# Patient Record
Sex: FEMALE | Race: WHITE
[De-identification: names, ages, dates, MRNs, and addresses within clinical notes are randomized per-mention and may not be internally consistent; named-entity substitution may affect disease eponyms.]

---

## 2021-03-18 ENCOUNTER — HOSPITAL ENCOUNTER (EMERGENCY)
Dept: HOSPITAL 56 - MW.ED | Age: 26
Discharge: NURSING, REHAB OR LONG-TERM CARE | End: 2021-03-18
Payer: COMMERCIAL

## 2021-03-18 VITALS — HEART RATE: 97 BPM | DIASTOLIC BLOOD PRESSURE: 55 MMHG | SYSTOLIC BLOOD PRESSURE: 129 MMHG

## 2021-03-18 DIAGNOSIS — Z95.4: ICD-10-CM

## 2021-03-18 DIAGNOSIS — Z86.73: ICD-10-CM

## 2021-03-18 DIAGNOSIS — R56.9: ICD-10-CM

## 2021-03-18 DIAGNOSIS — I10: ICD-10-CM

## 2021-03-18 DIAGNOSIS — Z79.899: ICD-10-CM
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
## 2021-03-18 DIAGNOSIS — Z79.82: ICD-10-CM

## 2021-03-18 DIAGNOSIS — Z79.01: ICD-10-CM

## 2021-03-18 DIAGNOSIS — S43.024A: Primary | ICD-10-CM

## 2021-03-18 LAB
BUN SERPL-MCNC: 13 MG/DL (ref 7–18)
CHLORIDE SERPL-SCNC: 100 MMOL/L (ref 98–107)
CO2 SERPL-SCNC: 26.2 MMOL/L (ref 21–32)
GLUCOSE SERPL-MCNC: 112 MG/DL (ref 74–106)
POTASSIUM SERPL-SCNC: 3.4 MMOL/L (ref 3.5–5.1)
SODIUM SERPL-SCNC: 138 MMOL/L (ref 136–145)

## 2021-03-18 PROCEDURE — 96375 TX/PRO/DX INJ NEW DRUG ADDON: CPT

## 2021-03-18 PROCEDURE — 96374 THER/PROPH/DIAG INJ IV PUSH: CPT

## 2021-03-18 PROCEDURE — 73030 X-RAY EXAM OF SHOULDER: CPT

## 2021-03-18 PROCEDURE — 85025 COMPLETE CBC W/AUTO DIFF WBC: CPT

## 2021-03-18 PROCEDURE — 85610 PROTHROMBIN TIME: CPT

## 2021-03-18 PROCEDURE — 84703 CHORIONIC GONADOTROPIN ASSAY: CPT

## 2021-03-18 PROCEDURE — 96376 TX/PRO/DX INJ SAME DRUG ADON: CPT

## 2021-03-18 PROCEDURE — 84484 ASSAY OF TROPONIN QUANT: CPT

## 2021-03-18 PROCEDURE — 36415 COLL VENOUS BLD VENIPUNCTURE: CPT

## 2021-03-18 PROCEDURE — 84146 ASSAY OF PROLACTIN: CPT

## 2021-03-18 PROCEDURE — 80053 COMPREHEN METABOLIC PANEL: CPT

## 2021-03-18 PROCEDURE — 99284 EMERGENCY DEPT VISIT MOD MDM: CPT

## 2021-03-18 PROCEDURE — 83735 ASSAY OF MAGNESIUM: CPT

## 2021-03-18 PROCEDURE — 70450 CT HEAD/BRAIN W/O DYE: CPT

## 2021-03-18 NOTE — CT
INDICATION:



Seizure. Right upper extremity weakness



TECHNIQUE:



CT head without contrast.



COMPARISON:



None available 



FINDINGS:



There is mild cerebral and cerebellar cortical atrophy for age. The 

ventricles are within normal limits. There is a left middle cranial fossa 

arachnoid cyst. There is no midline shift. An area of encephalomalacia is 

seen in the posteromedial left occipital lobe consistent with a chronic 

infarct/sequela of old insult. There is no loss of gray-white 

differentiation. A small ill-defined focus of increased attenuation in the 

anterior maria dolores on image 14 could be related to regional artifact. There is 

no evidence of an acute extra-axial hemorrhage. 



No acute calvarial fracture is seen. 



There is a left maxillary sinus mucosal retention cyst or polyp. The 

mastoid air cells are clear. The visualized orbits are within normal 

limits. 



IMPRESSION:



No evidence of an acute extra-axial hemorrhage, mass effect or loss of 

gray-white differentiation. A high attenuation focus in the anterior maria dolores 

could be related to regional artifact or may represent a nonspecific 

regional parenchymal calcification. A focus of age indeterminate 

parenchymal blood products cannot be excluded, although there are no 

findings of significant trauma. If clinically indicated, correlate with a 

short-term follow-up study in 4-8 hours.



Mild cerebral and cerebellar atrophy for age. Focal encephalomalacia in the 

posteromedial left occipital lobe. A left temporal arachnoid cyst.



Please note that all CT scans at this facility use dose modulation, 

iterative reconstruction, and/or weight-based dosing when appropriate to 

reduce radiation dose to as low as reasonably achievable.



Dictated by Simon Suggs MD @ Mar 18 2021  5:31AM



Signed by Dr. Simon Suggs @ Mar 18 2021  5:44AM

## 2021-03-18 NOTE — EDM.PDOC
ED HPI GENERAL MEDICAL PROBLEM





- General


Chief Complaint: Upper Extremity Injury/Pain


Stated Complaint: RIGHT SHOULDER PAIN


Time Seen by Provider: 03/18/21 04:07





- History of Present Illness


INITIAL COMMENTS - FREE TEXT/NARRATIVE: 





History of present illness:


[] Patient complains of severe pain and difficulty moving the right shoulder.  

Her boyfriend said that she was shaking from side to side as though she was 

having a seizure.  When she woke up she did not know his name and was confused 

gradually returning to baseline.  The patient now has pain in the right shoulder

 that makes it difficult to move the right shoulder.  The patient has had 

multiple strokes and 2 replaced heart valves.  She is on warfarin and aspirin.





The boyfriend is able to give a good history.  What he describes sounds like a 

tonic-clonic seizure.  Her movement woke him up.  She was asleep but she was 

having tonic-clonic motions that were symmetric.  After they stop she did not 

wake up for 2 to 3 minutes.  When she woke up she was unsteady and could not 

stand.  She did not know his name.  She was confused and looking for Tylenol and

 the spice rack.





She adds that she had a difficult time little more than 6 months ago and was 

drinking more than she thinks she should for about 6 months.  She stopped 3 days

 ago and thinks that that might be a contributor to her first and only seizure.





Last p.o. intake was dinnertime last night. 





Is a past history of congenital heart disease.  She had a pulmonic valve 

replacement and an aortic valve replacement and at least one of them is 

combination of tissue and metal.  Prior history of strokes as well.





Review of systems: 


As per history of present illness and below otherwise all systems reviewed and 

negative.





Past medical history: 


As per history of present illness and as reviewed below otherwise 

noncontributory.





Surgical history: 


As per history of present illness and as reviewed below otherwise 

noncontributory.





Social history: 


No reported history of drug or alcohol abuse.





Family history: 


As per history of present illness and as reviewed below otherwise 

noncontributory.





Physical exam:


Constitutional - well developed, well-nourished and in no acute distress


HEENT - normocephalic, no evidence of trauma - external nose and mouth normal - 

no mass in neck and no JVD - mucosae moist





EYES - full EOM, PERRL, no icterus - no evidence of inflammation, injection, or 

drainage





Respiratory - no respiratory distress, equal bilateral expansion, lungs clear to

 auscultation and no abnormal lung sounds





Cardiovascular - Regular Rhythm with S1 and S2 appreciated and no murmur, gallop

 or rub.





GI - abdomen soft without distension or organomegaly - normal bowel sounds - no 

guard or rebound





Musculoskeletal and tenderness in the right shoulder with very limited range of 

motion otherwise no gross deformity of long bones or joints - no tenderness, 

swelling or edema





Neurologic - Alert and oriented times four - CN II-XII grossly intact - motor 

sensory and coordination symmetrically normal as best I can tell although the 

exam of the right upper extremity does not include raising the arm in extension 

for drift because she has such limited range of motion and pain in the right 

shoulder.





Psychiatric - appropriate mood and affect with normal thought content





Hematologic - No petechiae or purpura - mucosa appropriate color and sclera not 

pale - normal nail bed color and refill





Integument - no rash or evidence of trauma - normal turgor





Diagnostics:


[]





Therapeutics:


[]





Impression: 


[]





Plan:


[]





Definitive disposition and diagnosis as appropriate pending reevaluation and 

review of above.








  ** right shoulder


Pain Score (Numeric/FACES): 4





- Related Data


                                    Allergies











Allergy/AdvReac Type Severity Reaction Status Date / Time


 


No Known Allergies Allergy   Verified 03/18/21 04:12











Home Meds: 


                                    Home Meds





Aspirin 81 mg PO DAILY 05/16/17 [History]


Lisinopril 20 tab PO DAILY 05/16/17 [History]


Warfarin [Coumadin] mg PO DAILY 03/18/21 [History]











Past Medical History


HEENT History: Reports: None


Cardiovascular History: Reports: Hypertension


Other Cardiovascular History: aortic stenosis, pulmonary stenosis,coartation of 

aorta


Respiratory History: Reports: None


Gastrointestinal History: Reports: None


Genitourinary History: Reports: None


OB/GYN History: Reports: None


Musculoskeletal History: Reports: None


Neurological History: Reports: None


Psychiatric History: Reports: Anxiety, Depression, Suicide Attempt, Suicidal 

Ideation


Endocrine/Metabolic History: Reports: None


Hematologic History: Reports: None


Immunologic History: Reports: None


Oncologic (Cancer) History: Reports: None


Dermatologic History: Reports: None





- Infectious Disease History


Infectious Disease History: Reports: None





- Past Surgical History


Cardiovascular Surgical History: Reports: Valve Replacement, Other (See Below)


Other Cardiovascular Surgeries/Procedures: stents replace





Social & Family History





- Family History


Family Medical History: No Pertinent Family History





- Caffeine Use


Caffeine Use: Reports: Energy Drinks, Soda





Review of Systems





- Review of Systems


Review Of Systems: Comprehensive ROS is negative, except as noted in HPI.





ED EXAM, GENERAL





- Physical Exam


Exam: See Below


Free Text/Narrative:: 





My physical exam is in the HPI


  ** #1 Interpretation


EKG Interpretation Comments: 





EKG done at 4:10 AM shows an AV dual paced complex with a heart rate of 64 and 

an axis of -81.  She has a QRS duration of 66 ms.  Comparison to her EKG is not 

valid because the only prior on the record was her intrinsic rhythm and not 

paced.  In paced rhythm





Course





- Vital Signs


Text/Narrative:: 





05 53 the patient has a CT that is been read and there is no obvious bleed or 

acute infarct.  The patient does have one area in the maria dolores that was 

indeterminate and in the differential was an age indeterminate spot with blood 

products.  Case discussed with Thai from anesthesia and Dr. LOZANO will see the 

patient and evaluate for possible sedation and reduction.





Dr. Rodriguez came to the department at 7:00.  For he agreed to sedate the patient 

so that I can try to reduce the shoulder we looked over the CT report and then 

the final report the radiologist as stated there is an area of lucency near the 

maria dolores that may represent blood.  With new onset seizure on blood thinners with a 

CT that is indeterminate for possible bleed and a posterior dislocation in a 

facility that has no orthopedics I called [facility] and discussed with Dr. TIMI Anderson who most graciously agreed that he accept the patient and arrange her

 care.


Last Recorded V/S: 


                                Last Vital Signs











Temp  36.6 C   03/18/21 04:08


 


Pulse  97   03/18/21 07:54


 


Resp  17   03/18/21 07:54


 


BP  129/55 L  03/18/21 07:54


 


Pulse Ox  97   03/18/21 07:54














- Orders/Labs/Meds


Orders: 


                               Active Orders 24 hr











 Category Date Time Status


 


 Saline Lock Insert [OM.PC] Stat Oth  03/18/21 04:11 Ordered











Labs: 


                                Laboratory Tests











  03/18/21 03/18/21 03/18/21 Range/Units





  04:35 04:35 04:35 


 


WBC  6.33    (4.0-11.0)  K/uL


 


RBC  3.62 L    (4.30-5.90)  M/uL


 


Hgb  12.9    (12.0-16.0)  g/dL


 


Hct  38.6    (36.0-46.0)  %


 


MCV  106.6 H    (80.0-98.0)  fL


 


MCH  35.6 H    (27.0-32.0)  pg


 


MCHC  33.4    (31.0-37.0)  g/dL


 


RDW Std Deviation  54.6    (28.0-62.0)  fl


 


RDW Coeff of Sayra  14    (11.0-15.0)  %


 


Plt Count  141 L    (150-400)  K/uL


 


MPV  9.80    (7.40-12.00)  fL


 


Neut % (Auto)  73.0    (48.0-80.0)  %


 


Lymph % (Auto)  14.7 L    (16.0-40.0)  %


 


Mono % (Auto)  11.5    (0.0-15.0)  %


 


Eos % (Auto)  0.5    (0.0-7.0)  %


 


Baso % (Auto)  0.3    (0.0-1.5)  %


 


Neut # (Auto)  4.6    (1.4-5.7)  K/uL


 


Lymph # (Auto)  0.9    (0.6-2.4)  K/uL


 


Mono # (Auto)  0.7    (0.0-0.8)  K/uL


 


Eos # (Auto)  0.0    (0.0-0.7)  K/uL


 


Baso # (Auto)  0.0    (0.0-0.1)  K/uL


 


Nucleated RBC %  0.0    /100WBC


 


Nucleated RBCs #  0    K/uL


 


INR   1.06   


 


Sodium    138  (136-145)  mmol/L


 


Potassium    3.4 L  (3.5-5.1)  mmol/L


 


Chloride    100  ()  mmol/L


 


Carbon Dioxide    26.2  (21.0-32.0)  mmol/L


 


BUN    13  (7.0-18.0)  mg/dL


 


Creatinine    0.8  (0.6-1.0)  mg/dL


 


Est Cr Clr Drug Dosing    111.37  mL/min


 


Estimated GFR (MDRD)    > 60.0  ml/min


 


Glucose    112 H  ()  mg/dL


 


Calcium    9.0  (8.5-10.1)  mg/dL


 


Magnesium    1.9  (1.8-2.4)  mg/dL


 


Total Bilirubin    1.7 H  (0.2-1.0)  mg/dL


 


AST    69 H  (15-37)  IU/L


 


ALT    45  (14-63)  IU/L


 


Alkaline Phosphatase    63  ()  U/L


 


Troponin I     (0.000-0.056)  ng/mL


 


Total Protein    7.6  (6.4-8.2)  g/dL


 


Albumin    3.9  (3.4-5.0)  g/dL


 


Globulin    3.7  (2.6-4.0)  g/dL


 


Albumin/Globulin Ratio    1.1  (0.9-1.6)  


 


Prolactin     ng/mL


 


HCG, Qual     (NEG)  














  03/18/21 03/18/21 03/18/21 Range/Units





  04:35 04:35 04:35 


 


WBC     (4.0-11.0)  K/uL


 


RBC     (4.30-5.90)  M/uL


 


Hgb     (12.0-16.0)  g/dL


 


Hct     (36.0-46.0)  %


 


MCV     (80.0-98.0)  fL


 


MCH     (27.0-32.0)  pg


 


MCHC     (31.0-37.0)  g/dL


 


RDW Std Deviation     (28.0-62.0)  fl


 


RDW Coeff of Sayra     (11.0-15.0)  %


 


Plt Count     (150-400)  K/uL


 


MPV     (7.40-12.00)  fL


 


Neut % (Auto)     (48.0-80.0)  %


 


Lymph % (Auto)     (16.0-40.0)  %


 


Mono % (Auto)     (0.0-15.0)  %


 


Eos % (Auto)     (0.0-7.0)  %


 


Baso % (Auto)     (0.0-1.5)  %


 


Neut # (Auto)     (1.4-5.7)  K/uL


 


Lymph # (Auto)     (0.6-2.4)  K/uL


 


Mono # (Auto)     (0.0-0.8)  K/uL


 


Eos # (Auto)     (0.0-0.7)  K/uL


 


Baso # (Auto)     (0.0-0.1)  K/uL


 


Nucleated RBC %     /100WBC


 


Nucleated RBCs #     K/uL


 


INR     


 


Sodium     (136-145)  mmol/L


 


Potassium     (3.5-5.1)  mmol/L


 


Chloride     ()  mmol/L


 


Carbon Dioxide     (21.0-32.0)  mmol/L


 


BUN     (7.0-18.0)  mg/dL


 


Creatinine     (0.6-1.0)  mg/dL


 


Est Cr Clr Drug Dosing     mL/min


 


Estimated GFR (MDRD)     ml/min


 


Glucose     ()  mg/dL


 


Calcium     (8.5-10.1)  mg/dL


 


Magnesium     (1.8-2.4)  mg/dL


 


Total Bilirubin     (0.2-1.0)  mg/dL


 


AST     (15-37)  IU/L


 


ALT     (14-63)  IU/L


 


Alkaline Phosphatase     ()  U/L


 


Troponin I   < 0.050   (0.000-0.056)  ng/mL


 


Total Protein     (6.4-8.2)  g/dL


 


Albumin     (3.4-5.0)  g/dL


 


Globulin     (2.6-4.0)  g/dL


 


Albumin/Globulin Ratio     (0.9-1.6)  


 


Prolactin    31.7  ng/mL


 


HCG, Qual  NEGATIVE    (NEG)  











Meds: 


Medications














Discontinued Medications














Generic Name Dose Route Start Last Admin





  Trade Name Freq  PRN Reason Stop Dose Admin


 


Chlordiazepoxide HCl  50 mg  03/18/21 08:52  03/18/21 08:58





  Chlordiazepoxide 25 Mg Cap  PO  03/18/21 08:53  50 mg





  ONETIME ONE   Administration


 


Hydromorphone HCl  0.5 mg  03/18/21 05:37  03/18/21 05:42





  Hydromorphone 1 Mg/Ml Syringe  IVPUSH  03/18/21 05:38  0.5 mg





  ONETIME ONE   Administration


 


Hydromorphone HCl  1 mg  03/18/21 05:52  03/18/21 06:48





  Hydromorphone 1 Mg/Ml Syringe  IVPUSH  03/18/21 05:53  1 mg





  ONETIME ONE   Administration


 


Hydromorphone HCl  0.5 mg  03/18/21 10:09  03/18/21 10:15





  Hydromorphone 2 Mg/Ml Syringe  IVPUSH  03/18/21 10:10  0.5 mg





  ONETIME ONE   Administration


 


Morphine Sulfate  4 mg  03/18/21 04:29  03/18/21 04:36





  Morphine 4 Mg/Ml Syringe  IVPUSH  03/18/21 04:30  4 mg





  ONETIME ONE   Administration


 


Ondansetron HCl  4 mg  03/18/21 04:29  03/18/21 04:36





  Ondansetron 4 Mg/2 Ml Sdv  IVPUSH  03/18/21 04:30  4 mg





  ONETIME ONE   Administration


 


Ondansetron HCl  4 mg  03/18/21 08:23  03/18/21 08:28





  Ondansetron 4 Mg/2 Ml Sdv  IVPUSH  03/18/21 08:24  4 mg





  ONETIME ONE   Administration


 


Sodium Chloride  10 ml  03/18/21 04:11  03/18/21 04:37





  Sodium Chloride 0.9% 10 Ml Syringe  FLUSH   10 ml





  ASDIRECTED PRN   Administration





  Keep Vein Open  


 


Sodium Chloride  2.5 ml  03/18/21 04:11  03/18/21 04:37





  Sodium Chloride 0.9% 2.5 Ml Syringe  FLUSH   2.5 ml





  ASDIRECTED PRN   Administration





  Keep Vein Open  














Departure





- Departure


Time of Disposition: 10:29


Disposition: DC/Tfer to [facility]


Clinical Impression: 


 Posterior dislocation of right shoulder joint, New onset seizure without head 

trauma








- Discharge Information


Referrals: 


PCP,None [Primary Care Provider] - 


Forms:  ED Department Discharge





Sepsis Event Note (ED)





- Evaluation


Sepsis Screening Result: No Definite Risk





- Focused Exam


Vital Signs: 


                                   Vital Signs











  Pulse Resp BP Pulse Ox


 


 03/18/21 07:54  97  17  129/55 L  97














- My Orders


Last 24 Hours: 


My Active Orders





03/18/21 04:11


Saline Lock Insert [OM.PC] Stat 














- Assessment/Plan


Last 24 Hours: 


My Active Orders





03/18/21 04:11


Saline Lock Insert [OM.PC] Stat

## 2021-03-18 NOTE — CR
Indication:



Pain and limited range of motion, following a seizure 



Technique:



Two views of the right shoulder



Comparison:



None available



Findings/Impression:



Bones: Apparent posterior dislocation/subluxation of the humeral head in 

relation to the glenoid. No displaced fracture seen. 



Joint spaces: Unremarkable.  



Soft tissues: Unremarkable.



Dictated by Smion Suggs MD @ Mar 18 2021  5:31AM



Signed by Dr. Simon Suggs @ Mar 18 2021  5:34AM

## 2023-04-01 ENCOUNTER — HOSPITAL ENCOUNTER (EMERGENCY)
Dept: HOSPITAL 56 - MW.ED | Age: 28
Discharge: HOME | End: 2023-04-01
Payer: COMMERCIAL

## 2023-04-01 VITALS — DIASTOLIC BLOOD PRESSURE: 64 MMHG | SYSTOLIC BLOOD PRESSURE: 137 MMHG | HEART RATE: 69 BPM

## 2023-04-01 DIAGNOSIS — Z79.82: ICD-10-CM

## 2023-04-01 DIAGNOSIS — Z79.01: ICD-10-CM

## 2023-04-01 DIAGNOSIS — Z95.0: ICD-10-CM

## 2023-04-01 DIAGNOSIS — Z79.899: ICD-10-CM

## 2023-04-01 DIAGNOSIS — I10: ICD-10-CM

## 2023-04-01 DIAGNOSIS — Z20.822: ICD-10-CM

## 2023-04-01 DIAGNOSIS — J06.9: Primary | ICD-10-CM

## 2023-04-01 LAB
FLUAV RNA UPPER RESP QL NAA+PROBE: NEGATIVE
FLUBV RNA UPPER RESP QL NAA+PROBE: NEGATIVE
SARS-COV-2 RNA RESP QL NAA+PROBE: NEGATIVE

## 2023-04-01 PROCEDURE — 99283 EMERGENCY DEPT VISIT LOW MDM: CPT

## 2023-04-01 PROCEDURE — 0240U: CPT
